# Patient Record
Sex: FEMALE | URBAN - METROPOLITAN AREA
[De-identification: names, ages, dates, MRNs, and addresses within clinical notes are randomized per-mention and may not be internally consistent; named-entity substitution may affect disease eponyms.]

---

## 2020-10-19 ENCOUNTER — OFFICE VISIT (OUTPATIENT)
Dept: URBAN - METROPOLITAN AREA CLINIC 41 | Facility: CLINIC | Age: 60
End: 2020-10-19
Payer: MEDICARE

## 2020-10-19 DIAGNOSIS — H35.352 CYSTOID MACULAR DEGENERATION, LEFT EYE: Primary | ICD-10-CM

## 2020-10-19 DIAGNOSIS — H18.422 BAND KERATOPATHY, LEFT EYE: ICD-10-CM

## 2020-10-19 PROCEDURE — 99214 OFFICE O/P EST MOD 30 MIN: CPT | Performed by: OPHTHALMOLOGY

## 2020-10-19 PROCEDURE — 92134 CPTRZ OPH DX IMG PST SGM RTA: CPT | Performed by: OPHTHALMOLOGY

## 2020-10-19 ASSESSMENT — INTRAOCULAR PRESSURE: OS: 4

## 2020-10-19 NOTE — IMPRESSION/PLAN
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 OCT OS= scarring with no SRF/IRF -44 V[facility] per history. 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 Plan: Previosuly treated with Oral steroids, IVK, topical drops, cellcept, cyclosporin, and methotrexate. She self discontinued immunosuppression due to side effects. She has not been on immunosuppression in a long time, by history appears to have been prior to antiTNF agents becoming available. Was off Humira x 2 weeks, but now back on Humira. Last Ozurdex was 9/7/17 No CME today CME hasn't recurred on Durezol QID OS Hypotonous OS. Likely secondary to long standing uveitis. IOP remains low despite ozurdex. 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 

FU 3 m, OCT OU, poss ozurdex OS

## 2021-05-13 ENCOUNTER — OFFICE VISIT (OUTPATIENT)
Dept: URBAN - METROPOLITAN AREA CLINIC 41 | Facility: CLINIC | Age: 61
End: 2021-05-13
Payer: MEDICARE

## 2021-05-13 DIAGNOSIS — H44.40 UNSPECIFIED HYPOTONY OF EYE: ICD-10-CM

## 2021-05-13 PROCEDURE — 92134 CPTRZ OPH DX IMG PST SGM RTA: CPT | Performed by: OPHTHALMOLOGY

## 2021-05-13 PROCEDURE — 99214 OFFICE O/P EST MOD 30 MIN: CPT | Performed by: OPHTHALMOLOGY

## 2021-05-13 RX ORDER — DUREZOL 0.5 MG/ML
0.05 % EMULSION OPHTHALMIC
Qty: 10 | Refills: 3 | Status: INACTIVE
Start: 2021-05-13 | End: 2021-05-17

## 2021-05-13 NOTE — IMPRESSION/PLAN
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 OCT OS= scarring with no SRF/IRF  [facility] per history. 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 Plan: Previosuly treated with Oral steroids, IVK, topical drops, cellcept, cyclosporin, and methotrexate. On Humira. Last Ozurdex was 9/7/17 No CME today CME hasn't recurred on Durezol QID OS Hypotonous OS. Likely secondary to long standing uveitis. IOP remains low despite ozurdex. No Ozurdex today - CSM with durezol QID OS Continue Humira Rec re-eval with Dr. Vamsi Paz for band keratopathy. 

FU 3-6 m, OCT OU, poss ozurdex OS

## 2022-01-10 ENCOUNTER — OFFICE VISIT (OUTPATIENT)
Dept: URBAN - METROPOLITAN AREA CLINIC 41 | Facility: CLINIC | Age: 62
End: 2022-01-10
Payer: MEDICARE

## 2022-01-10 DIAGNOSIS — H30.892 OTHER CHORIORETINAL INFLAMMATIONS, LEFT EYE: Primary | ICD-10-CM

## 2022-01-10 PROCEDURE — 99214 OFFICE O/P EST MOD 30 MIN: CPT | Performed by: OPHTHALMOLOGY

## 2022-01-10 PROCEDURE — 92134 CPTRZ OPH DX IMG PST SGM RTA: CPT | Performed by: OPHTHALMOLOGY

## 2022-01-10 RX ORDER — DUREZOL 0.5 MG/ML
0.05 % EMULSION OPHTHALMIC
Qty: 5 | Refills: 3 | Status: INACTIVE
Start: 2022-01-10 | End: 2022-02-02

## 2022-01-10 RX ORDER — ATROPINE SULFATE 10 MG/ML
1 % SOLUTION/ DROPS OPHTHALMIC
Qty: 5 | Refills: 3 | Status: INACTIVE
Start: 2022-01-10 | End: 2022-02-08

## 2022-07-29 ENCOUNTER — OFFICE VISIT (OUTPATIENT)
Dept: URBAN - METROPOLITAN AREA CLINIC 41 | Facility: CLINIC | Age: 62
End: 2022-07-29
Payer: MEDICARE

## 2022-07-29 DIAGNOSIS — H44.40 UNSPECIFIED HYPOTONY OF EYE: ICD-10-CM

## 2022-07-29 DIAGNOSIS — H30.892 OTHER CHORIORETINAL INFLAMMATIONS, LEFT EYE: Primary | ICD-10-CM

## 2022-07-29 DIAGNOSIS — H18.422 BAND KERATOPATHY, LEFT EYE: ICD-10-CM

## 2022-07-29 PROCEDURE — 92134 CPTRZ OPH DX IMG PST SGM RTA: CPT | Performed by: OPHTHALMOLOGY

## 2022-07-29 PROCEDURE — 99213 OFFICE O/P EST LOW 20 MIN: CPT | Performed by: OPHTHALMOLOGY

## 2022-07-29 RX ORDER — DUREZOL 0.5 MG/ML
0.05 % EMULSION OPHTHALMIC
Qty: 5 | Refills: 5 | Status: INACTIVE
Start: 2022-07-29 | End: 2022-08-27

## 2022-07-29 NOTE — IMPRESSION/PLAN
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 [facility] per history. 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 Plan: Meets definition of legal blindness. Had faxed to [phone] for home services Previously treated with Oral steroids, IVK, topical drops, cellcept, cyclosporin, and methotrexate. On Humira - but last treatment was in November - has not been able to get it due to what sounds supply chain Last Ozurdex was 9/7/17 No CME today CME hasn't recurred on Durezol QID OS and Atropine BID OS Hypotonous OS. Likely secondary to long standing uveitis. IOP remains low despite ozurdex. No Ozurdex today - CSM with durezol QID OS Continue Humira FU 3-6 m, OCT OU, poss ozurdex OS

## 2023-01-17 ENCOUNTER — OFFICE VISIT (OUTPATIENT)
Dept: URBAN - METROPOLITAN AREA CLINIC 27 | Facility: CLINIC | Age: 63
End: 2023-01-17
Payer: MEDICARE

## 2023-01-17 DIAGNOSIS — Z96.1 PRESENCE OF INTRAOCULAR LENS: ICD-10-CM

## 2023-01-17 DIAGNOSIS — H18.422 BAND KERATOPATHY, LEFT EYE: ICD-10-CM

## 2023-01-17 DIAGNOSIS — H35.352 CYSTOID MACULAR DEGENERATION, LEFT EYE: ICD-10-CM

## 2023-01-17 DIAGNOSIS — H44.521 PHTHISIS BULBI OF RIGHT EYE: ICD-10-CM

## 2023-01-17 DIAGNOSIS — H44.113 PANUVEITIS, BILATERAL: Primary | ICD-10-CM

## 2023-01-17 DIAGNOSIS — H44.40 UNSPECIFIED HYPOTONY OF EYE: ICD-10-CM

## 2023-01-17 PROCEDURE — 99213 OFFICE O/P EST LOW 20 MIN: CPT | Performed by: OPHTHALMOLOGY

## 2023-01-17 PROCEDURE — 92134 CPTRZ OPH DX IMG PST SGM RTA: CPT | Performed by: OPHTHALMOLOGY

## 2023-01-17 ASSESSMENT — INTRAOCULAR PRESSURE: OS: 6

## 2023-02-07 ENCOUNTER — OFFICE VISIT (OUTPATIENT)
Dept: URBAN - METROPOLITAN AREA CLINIC 41 | Facility: CLINIC | Age: 63
End: 2023-02-07
Payer: MEDICARE

## 2023-02-07 DIAGNOSIS — H44.113 PANUVEITIS, BILATERAL: Primary | ICD-10-CM

## 2023-02-07 PROCEDURE — 67028 INJECTION EYE DRUG: CPT | Performed by: STUDENT IN AN ORGANIZED HEALTH CARE EDUCATION/TRAINING PROGRAM

## 2023-03-21 ENCOUNTER — OFFICE VISIT (OUTPATIENT)
Dept: URBAN - METROPOLITAN AREA CLINIC 41 | Facility: CLINIC | Age: 63
End: 2023-03-21
Payer: MEDICARE

## 2023-03-21 DIAGNOSIS — H44.40 UNSPECIFIED HYPOTONY OF EYE: ICD-10-CM

## 2023-03-21 DIAGNOSIS — Z96.1 PRESENCE OF INTRAOCULAR LENS: ICD-10-CM

## 2023-03-21 DIAGNOSIS — H18.422 BAND KERATOPATHY, LEFT EYE: ICD-10-CM

## 2023-03-21 DIAGNOSIS — H44.521 PHTHISIS BULBI OF RIGHT EYE: ICD-10-CM

## 2023-03-21 DIAGNOSIS — H44.113 PANUVEITIS, BILATERAL: Primary | ICD-10-CM

## 2023-03-21 DIAGNOSIS — H35.352 CYSTOID MACULAR DEGENERATION, LEFT EYE: ICD-10-CM

## 2023-03-21 PROCEDURE — 92134 CPTRZ OPH DX IMG PST SGM RTA: CPT | Performed by: STUDENT IN AN ORGANIZED HEALTH CARE EDUCATION/TRAINING PROGRAM

## 2023-03-21 PROCEDURE — 99214 OFFICE O/P EST MOD 30 MIN: CPT | Performed by: STUDENT IN AN ORGANIZED HEALTH CARE EDUCATION/TRAINING PROGRAM

## 2023-03-21 RX ORDER — ATROPINE SULFATE 10 MG/ML
1 % SOLUTION/ DROPS OPHTHALMIC
Qty: 5 | Refills: 1 | Status: ACTIVE
Start: 2023-03-21

## 2023-03-21 NOTE — IMPRESSION/PLAN
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 -OCT unable to visualize macula to evaluate CME today.  
-Last Ozurdex 2/23 OS
-Previously treated with Oral steroids, IVK, topical drops, cellcept, cyclosporin, and methotrexate. 
-Continue Humira. Continue PF QID and atropine (decrease to BID) -May also benefit from [place].
-Exam stable, observe RTC: 6-8 weeks OCT OU/DFE OU

## 2023-05-09 ENCOUNTER — OFFICE VISIT (OUTPATIENT)
Dept: URBAN - METROPOLITAN AREA CLINIC 41 | Facility: CLINIC | Age: 63
End: 2023-05-09
Payer: MEDICARE

## 2023-05-09 DIAGNOSIS — H35.352 CYSTOID MACULAR DEGENERATION, LEFT EYE: ICD-10-CM

## 2023-05-09 DIAGNOSIS — H44.521 PHTHISIS BULBI OF RIGHT EYE: ICD-10-CM

## 2023-05-09 DIAGNOSIS — H44.113 PANUVEITIS, BILATERAL: Primary | ICD-10-CM

## 2023-05-09 DIAGNOSIS — Z96.1 PRESENCE OF INTRAOCULAR LENS: ICD-10-CM

## 2023-05-09 DIAGNOSIS — H44.40 UNSPECIFIED HYPOTONY OF EYE: ICD-10-CM

## 2023-05-09 DIAGNOSIS — H18.422 BAND KERATOPATHY, LEFT EYE: ICD-10-CM

## 2023-05-09 PROCEDURE — 99214 OFFICE O/P EST MOD 30 MIN: CPT | Performed by: STUDENT IN AN ORGANIZED HEALTH CARE EDUCATION/TRAINING PROGRAM

## 2023-05-09 NOTE — IMPRESSION/PLAN
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 -OCT unable to visualize macula to evaluate CME today.
-Last Ozurdex 2/23 OS
-Previously treated with Oral steroids, IVK, topical drops, cellcept, cyclosporin, and methotrexate. 
-Continue Humira. Continue PF QID and atropine (decrease to BID) -May also benefit from [facility] adonis.
-Exam stable, observe RTC: 4 weeks OCT OS/ IVK OS

## 2023-06-12 ENCOUNTER — PROCEDURE (OUTPATIENT)
Dept: URBAN - METROPOLITAN AREA CLINIC 41 | Facility: CLINIC | Age: 63
End: 2023-06-12
Payer: MEDICARE

## 2023-06-12 DIAGNOSIS — H44.113 PANUVEITIS, BILATERAL: Primary | ICD-10-CM

## 2023-06-12 PROCEDURE — 92134 CPTRZ OPH DX IMG PST SGM RTA: CPT | Performed by: STUDENT IN AN ORGANIZED HEALTH CARE EDUCATION/TRAINING PROGRAM

## 2023-06-12 PROCEDURE — 67028 INJECTION EYE DRUG: CPT | Performed by: STUDENT IN AN ORGANIZED HEALTH CARE EDUCATION/TRAINING PROGRAM

## 2023-08-21 ENCOUNTER — OFFICE VISIT (OUTPATIENT)
Dept: URBAN - METROPOLITAN AREA CLINIC 41 | Facility: CLINIC | Age: 63
End: 2023-08-21
Payer: MEDICARE

## 2023-08-21 DIAGNOSIS — H44.113 PANUVEITIS, BILATERAL: Primary | ICD-10-CM

## 2023-08-21 DIAGNOSIS — H44.521 PHTHISIS BULBI OF RIGHT EYE: ICD-10-CM

## 2023-08-21 DIAGNOSIS — Z96.1 PRESENCE OF INTRAOCULAR LENS: ICD-10-CM

## 2023-08-21 DIAGNOSIS — H35.352 CYSTOID MACULAR DEGENERATION, LEFT EYE: ICD-10-CM

## 2023-08-21 DIAGNOSIS — H44.40 UNSPECIFIED HYPOTONY OF EYE: ICD-10-CM

## 2023-08-21 PROCEDURE — 92134 CPTRZ OPH DX IMG PST SGM RTA: CPT | Performed by: STUDENT IN AN ORGANIZED HEALTH CARE EDUCATION/TRAINING PROGRAM

## 2023-08-21 PROCEDURE — 99214 OFFICE O/P EST MOD 30 MIN: CPT | Performed by: STUDENT IN AN ORGANIZED HEALTH CARE EDUCATION/TRAINING PROGRAM

## 2023-12-06 ENCOUNTER — OFFICE VISIT (OUTPATIENT)
Dept: URBAN - METROPOLITAN AREA CLINIC 41 | Facility: CLINIC | Age: 63
End: 2023-12-06
Payer: MEDICARE

## 2023-12-06 DIAGNOSIS — H35.352 CYSTOID MACULAR DEGENERATION, LEFT EYE: ICD-10-CM

## 2023-12-06 DIAGNOSIS — H44.521 PHTHISIS BULBI OF RIGHT EYE: ICD-10-CM

## 2023-12-06 DIAGNOSIS — H44.40 UNSPECIFIED HYPOTONY OF EYE: ICD-10-CM

## 2023-12-06 PROCEDURE — 67028 INJECTION EYE DRUG: CPT | Performed by: STUDENT IN AN ORGANIZED HEALTH CARE EDUCATION/TRAINING PROGRAM

## 2023-12-06 PROCEDURE — 92134 CPTRZ OPH DX IMG PST SGM RTA: CPT | Performed by: STUDENT IN AN ORGANIZED HEALTH CARE EDUCATION/TRAINING PROGRAM

## 2023-12-06 PROCEDURE — 99213 OFFICE O/P EST LOW 20 MIN: CPT | Performed by: STUDENT IN AN ORGANIZED HEALTH CARE EDUCATION/TRAINING PROGRAM

## 2023-12-06 RX ORDER — ATROPINE SULFATE 0.01 %
0.01 % DROPS, EMULSION OPHTHALMIC (EYE)
Qty: 10 | Refills: 4 | Status: ACTIVE
Start: 2023-12-06

## 2023-12-06 ASSESSMENT — INTRAOCULAR PRESSURE: OS: 4

## 2024-02-08 ENCOUNTER — OFFICE VISIT (OUTPATIENT)
Dept: URBAN - METROPOLITAN AREA CLINIC 41 | Facility: CLINIC | Age: 64
End: 2024-02-08
Payer: MEDICARE

## 2024-02-08 DIAGNOSIS — Z96.1 PRESENCE OF INTRAOCULAR LENS: ICD-10-CM

## 2024-02-08 DIAGNOSIS — H44.113 PANUVEITIS, BILATERAL: Primary | ICD-10-CM

## 2024-02-08 PROCEDURE — 99214 OFFICE O/P EST MOD 30 MIN: CPT | Performed by: STUDENT IN AN ORGANIZED HEALTH CARE EDUCATION/TRAINING PROGRAM

## 2024-02-08 PROCEDURE — 92134 CPTRZ OPH DX IMG PST SGM RTA: CPT | Performed by: STUDENT IN AN ORGANIZED HEALTH CARE EDUCATION/TRAINING PROGRAM

## 2024-02-08 PROCEDURE — 67515 INJECT/TREAT EYE SOCKET: CPT | Performed by: STUDENT IN AN ORGANIZED HEALTH CARE EDUCATION/TRAINING PROGRAM

## 2024-05-30 ENCOUNTER — OFFICE VISIT (OUTPATIENT)
Dept: URBAN - METROPOLITAN AREA CLINIC 41 | Facility: CLINIC | Age: 64
End: 2024-05-30
Payer: MEDICARE

## 2024-05-30 DIAGNOSIS — Z96.1 PRESENCE OF INTRAOCULAR LENS: ICD-10-CM

## 2024-05-30 DIAGNOSIS — H44.521 PHTHISIS BULBI OF RIGHT EYE: ICD-10-CM

## 2024-05-30 DIAGNOSIS — H44.40 UNSPECIFIED HYPOTONY OF EYE: ICD-10-CM

## 2024-05-30 DIAGNOSIS — H44.113 PANUVEITIS, BILATERAL: Primary | ICD-10-CM

## 2024-05-30 DIAGNOSIS — H35.352 CYSTOID MACULAR DEGENERATION, LEFT EYE: ICD-10-CM

## 2024-05-30 PROCEDURE — 99214 OFFICE O/P EST MOD 30 MIN: CPT | Performed by: STUDENT IN AN ORGANIZED HEALTH CARE EDUCATION/TRAINING PROGRAM

## 2024-05-30 PROCEDURE — 92134 CPTRZ OPH DX IMG PST SGM RTA: CPT | Performed by: STUDENT IN AN ORGANIZED HEALTH CARE EDUCATION/TRAINING PROGRAM

## 2024-09-16 ENCOUNTER — OFFICE VISIT (OUTPATIENT)
Dept: URBAN - METROPOLITAN AREA CLINIC 41 | Facility: CLINIC | Age: 64
End: 2024-09-16
Payer: MEDICARE

## 2024-09-16 DIAGNOSIS — Z96.1 PRESENCE OF INTRAOCULAR LENS: ICD-10-CM

## 2024-09-16 DIAGNOSIS — H44.113 PANUVEITIS, BILATERAL: Primary | ICD-10-CM

## 2024-09-16 DIAGNOSIS — H35.352 CYSTOID MACULAR DEGENERATION, LEFT EYE: ICD-10-CM

## 2024-09-16 DIAGNOSIS — H44.521 PHTHISIS BULBI OF RIGHT EYE: ICD-10-CM

## 2024-09-16 DIAGNOSIS — H44.40 UNSPECIFIED HYPOTONY OF EYE: ICD-10-CM

## 2024-09-16 PROCEDURE — 99214 OFFICE O/P EST MOD 30 MIN: CPT | Performed by: STUDENT IN AN ORGANIZED HEALTH CARE EDUCATION/TRAINING PROGRAM

## 2024-09-16 PROCEDURE — 67515 INJECT/TREAT EYE SOCKET: CPT | Performed by: STUDENT IN AN ORGANIZED HEALTH CARE EDUCATION/TRAINING PROGRAM

## 2024-09-16 RX ORDER — PREDNISOLONE ACETATE 10 MG/ML
1 % SUSPENSION/ DROPS OPHTHALMIC
Qty: 10 | Refills: 4 | Status: ACTIVE
Start: 2024-09-16

## 2024-09-16 RX ORDER — ATROPINE SULFATE 10 MG/ML
1 % SOLUTION OPHTHALMIC
Qty: 5 | Refills: 4 | Status: ACTIVE
Start: 2024-09-16

## 2025-01-09 ENCOUNTER — OFFICE VISIT (OUTPATIENT)
Dept: URBAN - METROPOLITAN AREA CLINIC 41 | Facility: CLINIC | Age: 65
End: 2025-01-09
Payer: MEDICARE

## 2025-01-09 DIAGNOSIS — H44.113 PANUVEITIS, BILATERAL: Primary | ICD-10-CM

## 2025-01-09 DIAGNOSIS — H44.521 PHTHISIS BULBI OF RIGHT EYE: ICD-10-CM

## 2025-01-09 DIAGNOSIS — Z96.1 PRESENCE OF INTRAOCULAR LENS: ICD-10-CM

## 2025-01-09 DIAGNOSIS — H35.352 CYSTOID MACULAR DEGENERATION, LEFT EYE: ICD-10-CM

## 2025-01-09 DIAGNOSIS — H44.40 UNSPECIFIED HYPOTONY OF EYE: ICD-10-CM

## 2025-01-09 PROCEDURE — 99214 OFFICE O/P EST MOD 30 MIN: CPT | Performed by: STUDENT IN AN ORGANIZED HEALTH CARE EDUCATION/TRAINING PROGRAM

## 2025-01-09 PROCEDURE — 92134 CPTRZ OPH DX IMG PST SGM RTA: CPT | Performed by: STUDENT IN AN ORGANIZED HEALTH CARE EDUCATION/TRAINING PROGRAM
